# Patient Record
Sex: MALE
[De-identification: names, ages, dates, MRNs, and addresses within clinical notes are randomized per-mention and may not be internally consistent; named-entity substitution may affect disease eponyms.]

---
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
## 2018-10-01 ENCOUNTER — HOSPITAL ENCOUNTER (EMERGENCY)
Dept: HOSPITAL 14 - H.ER | Age: 18
Discharge: HOME | End: 2018-10-01
Payer: COMMERCIAL

## 2018-10-01 VITALS
SYSTOLIC BLOOD PRESSURE: 131 MMHG | OXYGEN SATURATION: 99 % | DIASTOLIC BLOOD PRESSURE: 79 MMHG | TEMPERATURE: 98.7 F | HEART RATE: 78 BPM | RESPIRATION RATE: 18 BRPM

## 2018-10-01 VITALS — BODY MASS INDEX: 20.6 KG/M2
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
## 2018-10-01 DIAGNOSIS — S61.011A: Primary | ICD-10-CM
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
## 2018-10-01 NOTE — ED PDOC
HPI: Skin/Bite Injury


Chief Complaint (Provider): Abnormal Skin Integrity


History Per: Patient


History/Exam Limitations: no limitations


Onset/Duration Of Symptoms: Hrs (Today)


Current Symptoms Are (Timing): Still Present


Quality Of Symptoms: Painful


Additional Complaint(s): 





18 year old male presents to the ED for evaluation of a laceration to the right 

thumb that happened at work today.  Patient states he works at [employer] where 

he cut his thumb on a sharp object that was sticking out.  Last tetanus unknown.

 





PMD: none





<Chrissy Cutler - Last Filed: 10/01/18 22:00>





<Madelaine Price - Last Filed: 10/02/18 17:28>


Time Seen by Provider: 10/01/18 21:17


Chief Complaint (Nursing): Abnormal Skin Integrity





Past Medical History


Reviewed: Historical Data, Nursing Documentation, Vital Signs


Vital Signs: 





                                Last Vital Signs











Temp  98.7 F   10/01/18 21:18


 


Pulse  78   10/01/18 21:18


 


Resp  18   10/01/18 21:18


 


BP  131/79   10/01/18 21:18


 


Pulse Ox  99   10/01/18 21:18














- Medical History


PMH: No Chronic Diseases





- Surgical History


Surgical History: No Surg Hx





- Family History


Family History: States: Unknown Family Hx





- Living Arrangements


Living Arrangements: With Family





- Social History


Current smoker - smoking cessation education provided: No





<Chrissy Cutler - Last Filed: 10/01/18 22:00>


Vital Signs: 





                                Last Vital Signs











Temp  98.7 F   10/01/18 21:18


 


Pulse  78   10/01/18 21:18


 


Resp  18   10/01/18 21:18


 


BP  131/79   10/01/18 21:18


 


Pulse Ox  99   10/01/18 22:03














<Madelaine Price - Last Filed: 10/02/18 17:28>





- Home Medications


Home Medications: 


                                Ambulatory Orders











 Medication  Instructions  Recorded


 


RX: No Known Home Med  05/24/17














- Allergies


Allergies/Adverse Reactions: 


                                    Allergies











Allergy/AdvReac Type Severity Reaction Status Date / Time


 


No Known Allergies Allergy   Verified 10/01/18 21:17














Review of Systems


ROS Statement: Except As Marked, All Systems Reviewed And Found Negative


Skin: Positive for: Other (Laceration on right thumb)





<Chrissy Cutler - Last Filed: 10/01/18 22:00>





Physical Exam





- Reviewed


Nursing Documentation Reviewed: Yes


Vital Signs Reviewed: Yes





- Physical Exam


Appears: Positive for: Non-toxic, No Acute Distress


Head Exam: Positive for: ATRAUMATIC, NORMOCEPHALIC


Skin: Positive for: Normal Color, Warm, Dry


Eye Exam: Positive for: Normal appearance


Neck: Positive for: Normal, Painless ROM


Cardiovascular/Chest: Negative for: Murmur


Respiratory: Negative for: Wheezing, Respiratory Distress


Extremity: Positive for: Normal ROM


Neurologic/Psych: Positive for: Alert, Oriented.  Negative for: Motor/Sensory 

Deficits


Comments: 





2cm laceration to the right posterior thumb.





<Chrissy Cutler - Last Filed: 10/01/18 22:00>





- ECG


O2 Sat by Pulse Oximetry: 99 (RA)


Pulse Ox Interpretation: Normal





<Chrissy Cutler - Last Filed: 10/01/18 22:00>





Medical Decision Making


Medical Decision Making: 





Initial Impression: Right thumb laceration





Initial Plan: 


--Tetanus 0.5mL IM


--Lidocaine 5mL IJ





 

--------------------------------------------------------------------------------


-----------------


Scribe Attestation:


Documented by Maksim Beasley acting as a scribe for Chrissy POLLOCK.





Provider Scribe Attestation:


All medical record entries made by the Scribe were at my direction and 

personally dictated by me. I have reviewed the chart and agree that the record 

accurately reflects my personal performance of the history, physical exam, 

medical decision making, and the department course for this patient. I have also

personally directed, reviewed, and agree with the discharge instructions and 

disposition.





<Chrissy Cutler - Last Filed: 10/01/18 22:00>





Disposition





- Patient ED Disposition


Is Patient to be Admitted: No


Counseled Patient/Family Regarding: Diagnosis, Need For Followup





- Disposition


Disposition: Routine/Home


Disposition Time: 22:01





<Chrissy Cutler - Last Filed: 10/01/18 22:00>





<Madelaine Price - Last Filed: 10/02/18 17:28>





- Clinical Impression


Clinical Impression: 


 Tetanus toxoid vaccination administered at current visit, Finger laceration








- Disposition


Referrals: 


[facility] [Outside]


Condition: STABLE


Additional Instructions: 


Do not get wet for 24 hours. 


Keep clean and dry with antibiotic ointment twice a day. 


Suture removal in 2 weeks. 


Instructions:  Laceration Repair


Forms:  [facility] (English)





- PA / NP / Resident Statement


MD/DO has reviewed & agrees with the documentation as recorded.





<Madelaine Price - Last Filed: 10/02/18 17:28>





Procedures





- Laceration/Wound Repair


  ** Right Hand


Wound Length (cm): 2


Wound's Depth, Shape: superficial


Irrigated w/ Saline (ccs): 200


Volume Anesthetic (ccs): 1 (2% Lidocaine)


Wound Repaired With: Sutures


Suture Size/Type: 4:0, proline


Number of Sutures: 2 (Interrupted)


Wound Complexity: Simple





<Chrissy Cutler - Last Filed: 10/01/18 22:00>